# Patient Record
Sex: MALE | URBAN - METROPOLITAN AREA
[De-identification: names, ages, dates, MRNs, and addresses within clinical notes are randomized per-mention and may not be internally consistent; named-entity substitution may affect disease eponyms.]

---

## 2019-10-21 ENCOUNTER — APPOINTMENT (RX ONLY)
Dept: URBAN - METROPOLITAN AREA CLINIC 38 | Facility: CLINIC | Age: 84
Setting detail: DERMATOLOGY
End: 2019-10-21

## 2019-10-21 PROBLEM — C44.42 SQUAMOUS CELL CARCINOMA OF SKIN OF SCALP AND NECK: Status: ACTIVE | Noted: 2019-10-21

## 2019-10-21 PROCEDURE — 13121 CMPLX RPR S/A/L 2.6-7.5 CM: CPT

## 2019-10-21 PROCEDURE — 17311 MOHS 1 STAGE H/N/HF/G: CPT

## 2019-10-21 PROCEDURE — ? MOHS SURGERY

## 2019-10-21 PROCEDURE — ? COUNSELING

## 2019-10-21 NOTE — HPI: MOHS SURGERY CONSULTATION
Has The Cancer Been Biopsied Before?: has been previously biopsied
Body Location Override (Optional): Left parietal scalp
When Was Your Biopsy?: 08/26/19
Who Is Your Referring Provider?: Reid Moon

## 2019-10-25 ENCOUNTER — NEW PATIENT (OUTPATIENT)
Age: 84
End: 2019-10-25

## 2019-10-25 DIAGNOSIS — H43.21: ICD-10-CM

## 2019-10-25 DIAGNOSIS — H25.13: ICD-10-CM

## 2019-10-25 PROCEDURE — 92015 DETERMINE REFRACTIVE STATE: CPT | Mod: GY

## 2019-10-25 PROCEDURE — 99204 OFFICE O/P NEW MOD 45 MIN: CPT

## 2019-10-25 ASSESSMENT — KERATOMETRY
OD_K1POWER_DIOPTERS: 43.25
OD_K2POWER_DIOPTERS: 44.75
OS_AXISANGLE2_DEGREES: 179
OD_AXISANGLE_DEGREES: 97
OS_K2POWER_DIOPTERS: 44.25
OS_K1POWER_DIOPTERS: 43.5
OD_AXISANGLE2_DEGREES: 7
OS_AXISANGLE_DEGREES: 89

## 2019-10-25 ASSESSMENT — TONOMETRY
OS_IOP_MMHG: 14
OD_IOP_MMHG: 14

## 2019-10-25 ASSESSMENT — VISUAL ACUITY
OD_CC: 20/30-1
OS_CC: 20/30-2

## 2019-11-04 ENCOUNTER — APPOINTMENT (RX ONLY)
Dept: URBAN - METROPOLITAN AREA CLINIC 38 | Facility: CLINIC | Age: 84
Setting detail: DERMATOLOGY
End: 2019-11-04

## 2019-11-04 DIAGNOSIS — Z48.02 ENCOUNTER FOR REMOVAL OF SUTURES: ICD-10-CM

## 2019-11-04 PROBLEM — I48.91 UNSPECIFIED ATRIAL FIBRILLATION: Status: ACTIVE | Noted: 2019-11-04

## 2019-11-04 PROBLEM — Z85.46 PERSONAL HISTORY OF MALIGNANT NEOPLASM OF PROSTATE: Status: ACTIVE | Noted: 2019-11-04

## 2019-11-04 PROCEDURE — 99024 POSTOP FOLLOW-UP VISIT: CPT

## 2019-11-04 PROCEDURE — ? SUTURE REMOVAL (GLOBAL PERIOD)

## 2019-11-04 ASSESSMENT — LOCATION SIMPLE DESCRIPTION DERM: LOCATION SIMPLE: SCALP

## 2019-11-04 ASSESSMENT — LOCATION ZONE DERM: LOCATION ZONE: SCALP

## 2019-11-04 ASSESSMENT — LOCATION DETAILED DESCRIPTION DERM: LOCATION DETAILED: LEFT SUPERIOR PARIETAL SCALP

## 2019-11-04 NOTE — PROCEDURE: SUTURE REMOVAL (GLOBAL PERIOD)
Detail Level: Detailed
Add 15174 Cpt? (Important Note: In 2017 The Use Of 33478 Is Being Tracked By Cms To Determine Future Global Period Reimbursement For Global Periods): yes

## 2020-10-29 ENCOUNTER — ESTABLISHED COMPREHENSIVE EXAM (OUTPATIENT)
Age: 85
End: 2020-10-29

## 2020-10-29 DIAGNOSIS — H43.23: ICD-10-CM

## 2020-10-29 DIAGNOSIS — H25.13: ICD-10-CM

## 2020-10-29 PROCEDURE — 92014 COMPRE OPH EXAM EST PT 1/>: CPT

## 2020-10-29 ASSESSMENT — TONOMETRY
OD_IOP_MMHG: 13
OS_IOP_MMHG: 13

## 2020-10-29 ASSESSMENT — VISUAL ACUITY
OD_GLARE: 20/50
OS_CC: 20/30
OD_CC: 20/30
OS_GLARE: 20/50

## 2020-10-29 ASSESSMENT — KERATOMETRY
OS_K2POWER_DIOPTERS: 44.25
OS_AXISANGLE_DEGREES: 89
OD_K2POWER_DIOPTERS: 44.75
OS_AXISANGLE2_DEGREES: 179
OD_AXISANGLE_DEGREES: 97
OD_K1POWER_DIOPTERS: 43.25
OD_AXISANGLE2_DEGREES: 7
OS_K1POWER_DIOPTERS: 43.5

## 2021-04-29 ENCOUNTER — BRIEF - ESTABLISHED (OUTPATIENT)
Age: 86
End: 2021-04-29

## 2021-04-29 DIAGNOSIS — H43.23: ICD-10-CM

## 2021-04-29 DIAGNOSIS — H25.13: ICD-10-CM

## 2021-04-29 PROCEDURE — 99213 OFFICE O/P EST LOW 20 MIN: CPT

## 2021-04-29 ASSESSMENT — KERATOMETRY
OS_K1POWER_DIOPTERS: 43.5
OS_K2POWER_DIOPTERS: 44.25
OD_AXISANGLE_DEGREES: 97
OD_AXISANGLE2_DEGREES: 7
OD_K1POWER_DIOPTERS: 43.25
OS_AXISANGLE_DEGREES: 89
OD_K2POWER_DIOPTERS: 44.75
OS_AXISANGLE2_DEGREES: 179

## 2021-04-29 ASSESSMENT — VISUAL ACUITY
OD_CC: 20/40
OS_CC: 20/30

## 2021-04-29 ASSESSMENT — TONOMETRY
OS_IOP_MMHG: 12
OD_IOP_MMHG: 11

## 2021-09-09 ENCOUNTER — DIAGNOSTICS ONLY (OUTPATIENT)
Age: 86
End: 2021-09-09

## 2021-09-09 DIAGNOSIS — H25.812: ICD-10-CM

## 2021-09-09 PROCEDURE — 92136 OPHTHALMIC BIOMETRY: CPT

## 2021-09-09 ASSESSMENT — KERATOMETRY
OD_K2POWER_DIOPTERS: 44.75
OD_K1POWER_DIOPTERS: 43.25
OS_AXISANGLE2_DEGREES: 179
OD_AXISANGLE2_DEGREES: 7
OS_K2POWER_DIOPTERS: 44.25
OD_AXISANGLE_DEGREES: 97
OS_AXISANGLE_DEGREES: 89
OS_K1POWER_DIOPTERS: 43.5

## 2021-11-16 ENCOUNTER — SURGERY/PROCEDURE (OUTPATIENT)
Age: 86
End: 2021-11-16

## 2021-11-16 DIAGNOSIS — H25.812: ICD-10-CM

## 2021-11-16 PROCEDURE — 66984 XCAPSL CTRC RMVL W/O ECP: CPT

## 2021-11-16 ASSESSMENT — KERATOMETRY
OS_K2POWER_DIOPTERS: 44.25
OS_K1POWER_DIOPTERS: 43.5
OD_K1POWER_DIOPTERS: 43.25
OD_AXISANGLE2_DEGREES: 7
OD_AXISANGLE_DEGREES: 97
OD_K2POWER_DIOPTERS: 44.75
OS_AXISANGLE2_DEGREES: 179
OS_AXISANGLE_DEGREES: 89

## 2021-11-18 ENCOUNTER — POST-OP CHECK (OUTPATIENT)
Age: 86
End: 2021-11-18

## 2021-11-18 ENCOUNTER — PREPPED CHART (OUTPATIENT)
Age: 86
End: 2021-11-18

## 2021-11-18 DIAGNOSIS — H25.811: ICD-10-CM

## 2021-11-18 DIAGNOSIS — Z96.1: ICD-10-CM

## 2021-11-18 PROCEDURE — 92136 OPHTHALMIC BIOMETRY: CPT | Mod: 26

## 2021-11-18 PROCEDURE — 99024 POSTOP FOLLOW-UP VISIT: CPT

## 2021-11-18 ASSESSMENT — TONOMETRY: OS_IOP_MMHG: 06

## 2021-11-18 ASSESSMENT — VISUAL ACUITY
OS_SC: J2
OS_SC: 20/100

## 2021-12-02 ENCOUNTER — POST-OP CHECK (OUTPATIENT)
Age: 86
End: 2021-12-02

## 2021-12-02 DIAGNOSIS — Z96.1: ICD-10-CM

## 2021-12-02 DIAGNOSIS — H25.811: ICD-10-CM

## 2021-12-02 PROCEDURE — 99024 POSTOP FOLLOW-UP VISIT: CPT

## 2021-12-02 PROCEDURE — 92136 OPHTHALMIC BIOMETRY: CPT | Mod: 26

## 2021-12-02 ASSESSMENT — VISUAL ACUITY
OD_CC: 20/30
OS_SC: J1+
OD_GLARE: 20/50
OS_SC: 20/100

## 2021-12-02 ASSESSMENT — TONOMETRY: OS_IOP_MMHG: 08

## 2021-12-02 ASSESSMENT — KERATOMETRY
OS_K2POWER_DIOPTERS: 44.00
OS_K1POWER_DIOPTERS: 41.00
OS_AXISANGLE_DEGREES: 180
OS_AXISANGLE2_DEGREES: 90

## 2021-12-07 ENCOUNTER — SURGERY/PROCEDURE (OUTPATIENT)
Age: 86
End: 2021-12-07

## 2021-12-07 DIAGNOSIS — H25.811: ICD-10-CM

## 2021-12-07 PROCEDURE — 66984 XCAPSL CTRC RMVL W/O ECP: CPT | Mod: 79,RT

## 2021-12-07 ASSESSMENT — KERATOMETRY
OS_K1POWER_DIOPTERS: 41.00
OS_AXISANGLE2_DEGREES: 90
OS_K2POWER_DIOPTERS: 44.00
OS_AXISANGLE_DEGREES: 180

## 2021-12-09 ENCOUNTER — POST-OP CHECK (OUTPATIENT)
Age: 86
End: 2021-12-09

## 2021-12-09 DIAGNOSIS — Z96.1: ICD-10-CM

## 2021-12-09 PROCEDURE — 99024 POSTOP FOLLOW-UP VISIT: CPT

## 2021-12-09 ASSESSMENT — VISUAL ACUITY
OD_SC: J1-1
OS_SC: 20/100
OU_SC: J1
OD_SC: 20/100
OS_SC: J1

## 2021-12-09 ASSESSMENT — TONOMETRY
OD_IOP_MMHG: 09
OS_IOP_MMHG: 05

## 2021-12-16 ENCOUNTER — POST-OP CHECK (OUTPATIENT)
Age: 86
End: 2021-12-16

## 2021-12-16 DIAGNOSIS — Z96.1: ICD-10-CM

## 2021-12-16 PROCEDURE — 99024 POSTOP FOLLOW-UP VISIT: CPT

## 2021-12-16 PROCEDURE — 92015 DETERMINE REFRACTIVE STATE: CPT

## 2021-12-16 ASSESSMENT — VISUAL ACUITY
OD_SC: 20/100
OU_SC: J1
OS_PH: 20/50
OS_SC: 20/150
OD_PH: 20/50

## 2021-12-16 ASSESSMENT — KERATOMETRY
OS_K1POWER_DIOPTERS: 44.00
OS_AXISANGLE2_DEGREES: 155
OD_K2POWER_DIOPTERS: 44.50
OD_AXISANGLE_DEGREES: 91
OD_K1POWER_DIOPTERS: 42.75
OS_K2POWER_DIOPTERS: 45.25
OD_AXISANGLE2_DEGREES: 1
OS_AXISANGLE_DEGREES: 65

## 2021-12-16 ASSESSMENT — TONOMETRY
OS_IOP_MMHG: 11
OD_IOP_MMHG: 11

## 2022-01-06 ENCOUNTER — POST-OP CHECK (OUTPATIENT)
Age: 87
End: 2022-01-06

## 2022-01-06 DIAGNOSIS — Z96.1: ICD-10-CM

## 2022-01-06 PROCEDURE — 99024 POSTOP FOLLOW-UP VISIT: CPT

## 2022-01-06 ASSESSMENT — VISUAL ACUITY
OD_SC: 20/150-1
OS_SC: 20/150
OS_SC: J1
OD_SC: J2

## 2022-01-06 ASSESSMENT — KERATOMETRY
OS_AXISANGLE2_DEGREES: 157
OD_K2POWER_DIOPTERS: 45
OS_K1POWER_DIOPTERS: 44.25
OS_AXISANGLE_DEGREES: 67
OD_K1POWER_DIOPTERS: 43.5
OD_AXISANGLE_DEGREES: 88
OS_K2POWER_DIOPTERS: 45
OD_AXISANGLE2_DEGREES: 178

## 2022-01-06 ASSESSMENT — TONOMETRY
OS_IOP_MMHG: 06
OD_IOP_MMHG: 07

## 2023-01-19 ENCOUNTER — 1 YEAR COMPLETE EXAM (OUTPATIENT)
Dept: URBAN - METROPOLITAN AREA CLINIC 45 | Facility: CLINIC | Age: 88
End: 2023-01-19

## 2023-01-19 DIAGNOSIS — H43.23: ICD-10-CM

## 2023-01-19 DIAGNOSIS — Z96.1: ICD-10-CM

## 2023-01-19 DIAGNOSIS — H25.812: ICD-10-CM

## 2023-01-19 PROCEDURE — 92014 COMPRE OPH EXAM EST PT 1/>: CPT

## 2023-01-19 ASSESSMENT — VISUAL ACUITY
OS_CC: 20/25-1
OD_CC: 20/30-2